# Patient Record
Sex: MALE | Race: BLACK OR AFRICAN AMERICAN
[De-identification: names, ages, dates, MRNs, and addresses within clinical notes are randomized per-mention and may not be internally consistent; named-entity substitution may affect disease eponyms.]

---

## 2017-12-20 ENCOUNTER — HOSPITAL ENCOUNTER (EMERGENCY)
Dept: HOSPITAL 12 - ER | Age: 62
Discharge: HOME | End: 2017-12-20
Payer: COMMERCIAL

## 2017-12-20 VITALS — WEIGHT: 230 LBS | HEIGHT: 72 IN | BODY MASS INDEX: 31.15 KG/M2

## 2017-12-20 VITALS — DIASTOLIC BLOOD PRESSURE: 78 MMHG | SYSTOLIC BLOOD PRESSURE: 149 MMHG

## 2017-12-20 DIAGNOSIS — Z99.3: ICD-10-CM

## 2017-12-20 DIAGNOSIS — Z88.0: ICD-10-CM

## 2017-12-20 DIAGNOSIS — L98.499: Primary | ICD-10-CM

## 2017-12-20 PROCEDURE — A4663 DIALYSIS BLOOD PRESSURE CUFF: HCPCS

## 2022-01-14 ENCOUNTER — HOSPITAL ENCOUNTER (INPATIENT)
Dept: HOSPITAL 12 - ER | Age: 67
LOS: 6 days | Discharge: HOME | DRG: 579 | End: 2022-01-20
Payer: MEDICARE

## 2022-01-14 VITALS — WEIGHT: 230 LBS | HEIGHT: 72 IN | BODY MASS INDEX: 31.15 KG/M2

## 2022-01-14 DIAGNOSIS — N31.9: ICD-10-CM

## 2022-01-14 DIAGNOSIS — N17.0: ICD-10-CM

## 2022-01-14 DIAGNOSIS — Z99.3: ICD-10-CM

## 2022-01-14 DIAGNOSIS — S24.104S: ICD-10-CM

## 2022-01-14 DIAGNOSIS — J98.11: ICD-10-CM

## 2022-01-14 DIAGNOSIS — G82.20: ICD-10-CM

## 2022-01-14 DIAGNOSIS — U07.1: ICD-10-CM

## 2022-01-14 DIAGNOSIS — E83.51: ICD-10-CM

## 2022-01-14 DIAGNOSIS — Z87.440: ICD-10-CM
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
## 2022-01-14 DIAGNOSIS — L89.324: Primary | ICD-10-CM

## 2022-01-14 DIAGNOSIS — L89.890: ICD-10-CM

## 2022-01-14 LAB
BUN SERPL-MCNC: 17 MG/DL (ref 7–18)
CHLORIDE SERPL-SCNC: 98 MMOL/L (ref 98–107)
CO2 SERPL-SCNC: 25 MMOL/L (ref 21–32)
CREAT SERPL-MCNC: 1.2 MG/DL (ref 0.6–1.3)
GLUCOSE SERPL-MCNC: 91 MG/DL (ref 74–106)
HCT VFR BLD AUTO: 42.1 % (ref 36.7–47.1)
MCH RBC QN AUTO: 28.9 UUG (ref 23.8–33.4)
MCV RBC AUTO: 86.2 FL (ref 73–96.2)
PLATELET # BLD AUTO: 254 K/UL (ref 152–348)
POTASSIUM SERPL-SCNC: 3.8 MMOL/L (ref 3.5–5.1)

## 2022-01-14 PROCEDURE — A6209 FOAM DRSG <=16 SQ IN W/O BDR: HCPCS

## 2022-01-14 PROCEDURE — A4217 STERILE WATER/SALINE, 500 ML: HCPCS

## 2022-01-14 PROCEDURE — A4663 DIALYSIS BLOOD PRESSURE CUFF: HCPCS

## 2022-01-14 PROCEDURE — G0378 HOSPITAL OBSERVATION PER HR: HCPCS

## 2022-01-14 RX ADMIN — ENOXAPARIN SODIUM SCH MG: 40 INJECTION SUBCUTANEOUS at 21:00

## 2022-01-14 RX ADMIN — Medication SCH MG: at 22:34

## 2022-01-14 NOTE — NUR
Pt's cousin, Juan Jose, called to inquire about the pts condition.  I went and told 
the pt Juan Jose was on the line, the pt at this time said he was feeling tired and 
wanted to get some rest so he told me to update Juan Jose and that He would call 
him in the morning.  I obliged.  I told Juan Jose that the pt was here in the ED 
and was doing fine, VSS, pt was informed of the phone call and said that he 
would call him in the morning. I told Juan Jose that I would take great care of 
Harrison all night.  Juan Jose was very thankful.

## 2022-01-14 NOTE — NUR
Thorough report was recieved from charge nurse Ravi, regarding pts status.  
Basically pt is admitted and in holding pattern til room opens up and bed is 
assigned.  Nothing pending on the pt except scheduled meds.  Pt is doing good, 
with good  color and appearance. VSS, PEWNL, Pt aaox4.  Pt denies any pain, 
n/v, sob, or discomfort at this time.

## 2022-01-15 VITALS — DIASTOLIC BLOOD PRESSURE: 59 MMHG | SYSTOLIC BLOOD PRESSURE: 114 MMHG

## 2022-01-15 VITALS — DIASTOLIC BLOOD PRESSURE: 67 MMHG | SYSTOLIC BLOOD PRESSURE: 96 MMHG

## 2022-01-15 VITALS — DIASTOLIC BLOOD PRESSURE: 55 MMHG | SYSTOLIC BLOOD PRESSURE: 109 MMHG

## 2022-01-15 LAB
BUN SERPL-MCNC: 15 MG/DL (ref 7–18)
CHLORIDE SERPL-SCNC: 100 MMOL/L (ref 98–107)
CHOLEST SERPL-MCNC: 116 MG/DL (ref ?–200)
CO2 SERPL-SCNC: 24 MMOL/L (ref 21–32)
CREAT SERPL-MCNC: 1 MG/DL (ref 0.6–1.3)
FERRITIN SERPL-MCNC: 1443 NG/ML (ref 26–388)
GLUCOSE SERPL-MCNC: 98 MG/DL (ref 74–106)
HCT VFR BLD AUTO: 39.3 % (ref 36.7–47.1)
HDLC SERPL-MCNC: 31 MG/DL (ref 40–60)
LDH SERPL L TO P-CCNC: 303 U/L (ref 85–227)
MAGNESIUM SERPL-MCNC: 2 MG/DL (ref 1.8–2.4)
MCH RBC QN AUTO: 28.7 UUG (ref 23.8–33.4)
MCV RBC AUTO: 85.3 FL (ref 73–96.2)
PHOSPHATE SERPL-MCNC: 3.1 MG/DL (ref 2.5–4.9)
PLATELET # BLD AUTO: 260 K/UL (ref 152–348)
POTASSIUM SERPL-SCNC: 3.7 MMOL/L (ref 3.5–5.1)
TRIGL SERPL-MCNC: 108 MG/DL (ref 30–150)

## 2022-01-15 RX ADMIN — ENOXAPARIN SODIUM SCH MG: 40 INJECTION SUBCUTANEOUS at 21:20

## 2022-01-15 RX ADMIN — Medication SCH MG: at 14:33

## 2022-01-15 RX ADMIN — Medication SCH MG: at 21:12

## 2022-01-15 RX ADMIN — PANTOPRAZOLE SODIUM SCH MG: 40 TABLET, DELAYED RELEASE ORAL at 07:21

## 2022-01-15 RX ADMIN — Medication SCH EACH: at 14:33

## 2022-01-15 RX ADMIN — Medication SCH MG: at 06:00

## 2022-01-15 RX ADMIN — DEXTROSE SCH MLS/HR: 5 SOLUTION INTRAVENOUS at 14:33

## 2022-01-15 NOTE — NUR
Received patient from ED via [employer]. Patient AOx4. On room air. No signs of acute distress. 
Patient denied pain/ discomfort. Patient denied SOB/ . Oriented patient to unit and room. 
Call light within reach. Bed alarm on. Vital signs WNL. Will continue to monitor.

## 2022-01-15 NOTE — NUR
Pt was moved from room 5a to 1B due to his covid pos status.  Pt is not very 
comfortable in the new room due to the ambient fan noise.  I assured the pt 
that hopefully he ought to be in his room, on a hospital bed by no later than 
0830, and that his EDRN will try to expodite as much as possible to get him up 
as soon as possible.  Pt is doing good, AAOx4, completely asymptomatic, VSS, PE 
WNL. On bedside monitor in NSR without ectopy.

## 2022-01-15 NOTE — NUR
Received patient lying in bed. AAOX4. Able to make needs known. IV access on R AC, intact 
and patent. Safety measures initiated. Will continue to monitor.

## 2022-01-16 VITALS — DIASTOLIC BLOOD PRESSURE: 66 MMHG | SYSTOLIC BLOOD PRESSURE: 109 MMHG

## 2022-01-16 VITALS — SYSTOLIC BLOOD PRESSURE: 117 MMHG | DIASTOLIC BLOOD PRESSURE: 72 MMHG

## 2022-01-16 VITALS — SYSTOLIC BLOOD PRESSURE: 119 MMHG | DIASTOLIC BLOOD PRESSURE: 66 MMHG

## 2022-01-16 VITALS — DIASTOLIC BLOOD PRESSURE: 70 MMHG | SYSTOLIC BLOOD PRESSURE: 110 MMHG

## 2022-01-16 LAB
BUN SERPL-MCNC: 15 MG/DL (ref 7–18)
CHLORIDE SERPL-SCNC: 101 MMOL/L (ref 98–107)
CO2 SERPL-SCNC: 28 MMOL/L (ref 21–32)
CREAT SERPL-MCNC: 1.1 MG/DL (ref 0.6–1.3)
GLUCOSE SERPL-MCNC: 110 MG/DL (ref 74–106)
HCT VFR BLD AUTO: 39.6 % (ref 36.7–47.1)
MAGNESIUM SERPL-MCNC: 2.1 MG/DL (ref 1.8–2.4)
MCH RBC QN AUTO: 29.1 UUG (ref 23.8–33.4)
MCV RBC AUTO: 86.1 FL (ref 73–96.2)
PHOSPHATE SERPL-MCNC: 3.3 MG/DL (ref 2.5–4.9)
PLATELET # BLD AUTO: 315 K/UL (ref 152–348)
POTASSIUM SERPL-SCNC: 3.8 MMOL/L (ref 3.5–5.1)

## 2022-01-16 RX ADMIN — Medication SCH MG: at 06:38

## 2022-01-16 RX ADMIN — Medication SCH EACH: at 08:51

## 2022-01-16 RX ADMIN — DEXTROSE SCH MLS/HR: 5 SOLUTION INTRAVENOUS at 01:58

## 2022-01-16 RX ADMIN — Medication SCH MG: at 22:31

## 2022-01-16 RX ADMIN — Medication SCH MG: at 15:44

## 2022-01-16 RX ADMIN — PANTOPRAZOLE SODIUM SCH MG: 40 TABLET, DELAYED RELEASE ORAL at 06:38

## 2022-01-16 RX ADMIN — ENOXAPARIN SODIUM SCH MG: 40 INJECTION SUBCUTANEOUS at 20:29

## 2022-01-16 RX ADMIN — DEXTROSE SCH MLS/HR: 5 SOLUTION INTRAVENOUS at 15:45

## 2022-01-17 VITALS — DIASTOLIC BLOOD PRESSURE: 58 MMHG | SYSTOLIC BLOOD PRESSURE: 115 MMHG

## 2022-01-17 VITALS — SYSTOLIC BLOOD PRESSURE: 125 MMHG | DIASTOLIC BLOOD PRESSURE: 72 MMHG

## 2022-01-17 VITALS — SYSTOLIC BLOOD PRESSURE: 109 MMHG | DIASTOLIC BLOOD PRESSURE: 57 MMHG

## 2022-01-17 VITALS — DIASTOLIC BLOOD PRESSURE: 59 MMHG | SYSTOLIC BLOOD PRESSURE: 112 MMHG

## 2022-01-17 LAB
BUN SERPL-MCNC: 16 MG/DL (ref 7–18)
CHLORIDE SERPL-SCNC: 103 MMOL/L (ref 98–107)
CO2 SERPL-SCNC: 25 MMOL/L (ref 21–32)
CREAT SERPL-MCNC: 1.4 MG/DL (ref 0.6–1.3)
GLUCOSE SERPL-MCNC: 105 MG/DL (ref 74–106)
POTASSIUM SERPL-SCNC: 3.9 MMOL/L (ref 3.5–5.1)

## 2022-01-17 RX ADMIN — ENOXAPARIN SODIUM SCH MG: 40 INJECTION SUBCUTANEOUS at 21:00

## 2022-01-17 RX ADMIN — Medication SCH EACH: at 09:28

## 2022-01-17 RX ADMIN — Medication SCH MG: at 05:58

## 2022-01-17 RX ADMIN — PANTOPRAZOLE SODIUM SCH MG: 40 TABLET, DELAYED RELEASE ORAL at 06:02

## 2022-01-17 RX ADMIN — DEXTROSE SCH MLS/HR: 5 SOLUTION INTRAVENOUS at 02:00

## 2022-01-17 RX ADMIN — Medication SCH MG: at 22:00

## 2022-01-17 RX ADMIN — Medication SCH MG: at 13:11

## 2022-01-17 NOTE — NUR
Patient resting in bed. In no acute distress. On RA. no SOB. States cough is still present, 
dry, but rare.IV to left AC, patent and intact. Denies any pain or discomfort. Safety 
measures started and call light within reach.

## 2022-01-18 VITALS — DIASTOLIC BLOOD PRESSURE: 58 MMHG | SYSTOLIC BLOOD PRESSURE: 105 MMHG

## 2022-01-18 VITALS — SYSTOLIC BLOOD PRESSURE: 135 MMHG | DIASTOLIC BLOOD PRESSURE: 64 MMHG

## 2022-01-18 VITALS — DIASTOLIC BLOOD PRESSURE: 67 MMHG | SYSTOLIC BLOOD PRESSURE: 113 MMHG

## 2022-01-18 LAB
BUN SERPL-MCNC: 20 MG/DL (ref 7–18)
CHLORIDE SERPL-SCNC: 103 MMOL/L (ref 98–107)
CO2 SERPL-SCNC: 25 MMOL/L (ref 21–32)
CREAT SERPL-MCNC: 1.6 MG/DL (ref 0.6–1.3)
GLUCOSE SERPL-MCNC: 98 MG/DL (ref 74–106)
HCT VFR BLD AUTO: 37.2 % (ref 36.7–47.1)
MAGNESIUM SERPL-MCNC: 2.2 MG/DL (ref 1.8–2.4)
MCH RBC QN AUTO: 29.3 UUG (ref 23.8–33.4)
MCV RBC AUTO: 86.2 FL (ref 73–96.2)
PHOSPHATE SERPL-MCNC: 3.4 MG/DL (ref 2.5–4.9)
PLATELET # BLD AUTO: 389 K/UL (ref 152–348)
POTASSIUM SERPL-SCNC: 4 MMOL/L (ref 3.5–5.1)

## 2022-01-18 PROCEDURE — 0KBP0ZZ EXCISION OF LEFT HIP MUSCLE, OPEN APPROACH: ICD-10-PCS | Performed by: NURSE PRACTITIONER

## 2022-01-18 RX ADMIN — SODIUM HYPOCHLORITE SCH ML: 1.25 SOLUTION TOPICAL at 11:33

## 2022-01-18 RX ADMIN — Medication SCH MG: at 21:38

## 2022-01-18 RX ADMIN — Medication SCH EACH: at 15:54

## 2022-01-18 RX ADMIN — Medication SCH MG: at 06:16

## 2022-01-18 RX ADMIN — Medication SCH ML: at 15:54

## 2022-01-18 RX ADMIN — Medication SCH MG: at 15:54

## 2022-01-18 RX ADMIN — PANTOPRAZOLE SODIUM SCH MG: 40 TABLET, DELAYED RELEASE ORAL at 06:16

## 2022-01-18 RX ADMIN — ENOXAPARIN SODIUM SCH MG: 40 INJECTION SUBCUTANEOUS at 20:57

## 2022-01-18 RX ADMIN — Medication SCH EACH: at 09:08

## 2022-01-18 NOTE — NUR
Pt alert and oriented.  Left groin wound had serous sanguinous drainage noted.  Buttocks 
serous drainage noted.  Reapplied mepilex.  F/u with 1st step air [facility] with central.  
Awaiting wound care nurse today.

## 2022-01-18 NOTE — NUR
Social Work APS Report

Protective Services Report (Intake ID [id number]) was submitted on 01/18/2022 at 15:34:33. A 
copy has been placed in patient's chart.
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
## 2022-01-18 NOTE — NUR
Slept well throughout shift. Self catheterized one time this sift. Refuses to self cath or 
allow this RN to cath again. no significant events this shift. Able to re-inforce dressing 
to Bilateral buttocks and scrotum. Pending wound eval. Call light within reach.

## 2022-01-18 NOTE — NUR
WOUND CARE CONSULT: REVIEWED CHART,NURSING DOCUMENTATION AND PHOTOS WHICH INDICATE 
UNSTAGEABLE PRESSURE ULCERS TO SACRUM AND BILATERAL BUTTOCKS, PRESENT ON ADMISSION. DR DARIA RODRIGES ON CASE. DISCUSSED WOUND CARE RECOMMENDATIONS WITH NURSING STAFF AND SURGICAL TEAM. 
FIRST STEP MATTRESS IS ORDERED. MD IN AGREEMENT WITH PLAN OF CARE.

## 2022-01-19 LAB
BUN SERPL-MCNC: 21 MG/DL (ref 7–18)
CHLORIDE SERPL-SCNC: 104 MMOL/L (ref 98–107)
CO2 SERPL-SCNC: 24 MMOL/L (ref 21–32)
CREAT SERPL-MCNC: 1.6 MG/DL (ref 0.6–1.3)
GLUCOSE SERPL-MCNC: 101 MG/DL (ref 74–106)
HCT VFR BLD AUTO: 37.6 % (ref 36.7–47.1)
MAGNESIUM SERPL-MCNC: 2.2 MG/DL (ref 1.8–2.4)
MCH RBC QN AUTO: 28.6 UUG (ref 23.8–33.4)
MCV RBC AUTO: 86.1 FL (ref 73–96.2)
PHOSPHATE SERPL-MCNC: 3.4 MG/DL (ref 2.5–4.9)
PLATELET # BLD AUTO: 423 K/UL (ref 152–348)
POTASSIUM SERPL-SCNC: 4.3 MMOL/L (ref 3.5–5.1)
VANCOMYCIN SERPL-MCNC: 12.7 UG/ML (ref 18–26)

## 2022-01-19 PROCEDURE — 05HY33Z INSERTION OF INFUSION DEVICE INTO UPPER VEIN, PERCUTANEOUS APPROACH: ICD-10-PCS

## 2022-01-19 RX ADMIN — Medication SCH MG: at 13:54

## 2022-01-19 RX ADMIN — OXYCODONE HYDROCHLORIDE AND ACETAMINOPHEN SCH MG: 500 TABLET ORAL at 09:03

## 2022-01-19 RX ADMIN — PANTOPRAZOLE SODIUM SCH MG: 40 TABLET, DELAYED RELEASE ORAL at 06:12

## 2022-01-19 RX ADMIN — ENOXAPARIN SODIUM SCH MG: 40 INJECTION SUBCUTANEOUS at 20:36

## 2022-01-19 RX ADMIN — Medication SCH MG: at 21:10

## 2022-01-19 RX ADMIN — Medication SCH EACH: at 17:58

## 2022-01-19 RX ADMIN — Medication SCH EACH: at 09:09

## 2022-01-19 RX ADMIN — Medication SCH ML: at 17:58

## 2022-01-19 RX ADMIN — Medication SCH ML: at 09:09

## 2022-01-19 RX ADMIN — SODIUM HYPOCHLORITE SCH ML: 1.25 SOLUTION TOPICAL at 09:10

## 2022-01-19 RX ADMIN — Medication SCH MG: at 05:20

## 2022-01-19 RX ADMIN — ZINC SULFATE CAP 220 MG (50 MG ELEMENTAL ZN) SCH MG: 220 (50 ZN) CAP at 09:03

## 2022-01-19 NOTE — NUR
pt is a/o x 4, no complaint of pain or SOB. Pt's IV is occluded, unable to administer 
morning iv vanco. Notified pharmacy. Pending midline insertion for pt. Attempted peripheral 
IV insertion, unsuccessful. Vitals within normal limits /67 HR 90. Comfort measures 
provided, call light within reach, will continue to monitor pt.

## 2022-01-19 NOTE — NUR
Awake and alert and oriented x4 Needs attended. VSS. Denies any 

pain nor any discomfort. Dressing changed to left ischial ulcer done.

Incontinent of BM x1 Kept clean and dry. Patient do own self catheterization

with yellow urine noted. All due meds given as ordered. Fall precautions

noted. No acute distress noted.

## 2022-01-20 LAB
BUN SERPL-MCNC: 26 MG/DL (ref 7–18)
CHLORIDE SERPL-SCNC: 105 MMOL/L (ref 98–107)
CO2 SERPL-SCNC: 24 MMOL/L (ref 21–32)
CREAT SERPL-MCNC: 1.6 MG/DL (ref 0.6–1.3)
GLUCOSE SERPL-MCNC: 98 MG/DL (ref 74–106)
HCT VFR BLD AUTO: 37.5 % (ref 36.7–47.1)
MAGNESIUM SERPL-MCNC: 2.2 MG/DL (ref 1.8–2.4)
MCH RBC QN AUTO: 28.8 UUG (ref 23.8–33.4)
MCV RBC AUTO: 86.4 FL (ref 73–96.2)
NEUTS SEG NFR BLD MANUAL: 0 % (ref 42–75)
PHOSPHATE SERPL-MCNC: 3.4 MG/DL (ref 2.5–4.9)
PLATELET # BLD AUTO: 497 K/UL (ref 152–348)
POTASSIUM SERPL-SCNC: 4.2 MMOL/L (ref 3.5–5.1)

## 2022-01-20 RX ADMIN — Medication SCH ML: at 09:12

## 2022-01-20 RX ADMIN — Medication SCH MG: at 05:36

## 2022-01-20 RX ADMIN — PANTOPRAZOLE SODIUM SCH MG: 40 TABLET, DELAYED RELEASE ORAL at 06:12

## 2022-01-20 RX ADMIN — Medication SCH MG: at 14:13

## 2022-01-20 RX ADMIN — Medication SCH EACH: at 09:12

## 2022-01-20 RX ADMIN — OXYCODONE HYDROCHLORIDE AND ACETAMINOPHEN SCH MG: 500 TABLET ORAL at 09:12

## 2022-01-20 RX ADMIN — ZINC SULFATE CAP 220 MG (50 MG ELEMENTAL ZN) SCH MG: 220 (50 ZN) CAP at 09:12

## 2022-01-20 RX ADMIN — SODIUM HYPOCHLORITE SCH ML: 1.25 SOLUTION TOPICAL at 09:13

## 2022-01-20 NOTE — NUR
received call from deepthi at [facility] wishing to speak to patient. informed patient, 
per patient have her call my cousin, called deepthi to inform her with no answer, message 
was left.

## 2022-01-20 NOTE — NUR
assisted patient onto vehicle safely, all belongings with patient. patient upon d/c covid +  
reminded patient to isolate until covid - status, v/s wnl , 0 cough, rr even and nonlabored.

## 2022-01-20 NOTE — NUR
discussed all discharge orders with patient reminded patient " to f/u with hcp w/n 1-2 wk. 
To complete ATB course and take current medication as ordered, patient states 
understanding." midline and right ac peripheral line removed, minimal bleeding noted, 
dressing applied. belongings list complete all belongings present.

## 2022-01-20 NOTE — NUR
AA)x4 Bedb ound. On continous IVF's infusing well via right upper arm midline.

Needs attended. Denies any pain nor any discomfort. Dressing intact to left

ischial buttock. Patient do own sherman catheterization, moderate amount of yellow

urine noted. Will monitor patient. Possible d/c to home with home health today.

## 2022-01-28 NOTE — NUR
Clinical Social Work Note



KAREN spoke with Violetta at [facility] and provided information regarding 
patient's discharge plan.